# Patient Record
Sex: MALE | Race: BLACK OR AFRICAN AMERICAN | NOT HISPANIC OR LATINO | URBAN - METROPOLITAN AREA
[De-identification: names, ages, dates, MRNs, and addresses within clinical notes are randomized per-mention and may not be internally consistent; named-entity substitution may affect disease eponyms.]

---

## 2018-09-12 ENCOUNTER — TRANSFERRED RECORDS (OUTPATIENT)
Dept: HEALTH INFORMATION MANAGEMENT | Facility: CLINIC | Age: 53
End: 2018-09-12

## 2018-10-03 ENCOUNTER — PATIENT OUTREACH (OUTPATIENT)
Dept: CARE COORDINATION | Facility: CLINIC | Age: 53
End: 2018-10-03

## 2023-05-26 ENCOUNTER — HOSPITAL ENCOUNTER (EMERGENCY)
Facility: CLINIC | Age: 58
Discharge: HOME | End: 2023-05-26
Attending: EMERGENCY MEDICINE | Admitting: EMERGENCY MEDICINE
Payer: COMMERCIAL

## 2023-05-26 VITALS
SYSTOLIC BLOOD PRESSURE: 131 MMHG | DIASTOLIC BLOOD PRESSURE: 80 MMHG | WEIGHT: 155 LBS | TEMPERATURE: 97.4 F | HEART RATE: 79 BPM | RESPIRATION RATE: 14 BRPM | OXYGEN SATURATION: 97 %

## 2023-05-26 DIAGNOSIS — M54.6 CHRONIC MIDLINE THORACIC BACK PAIN: ICD-10-CM

## 2023-05-26 DIAGNOSIS — M54.16 LUMBAR RADICULOPATHY: ICD-10-CM

## 2023-05-26 DIAGNOSIS — G89.29 CHRONIC MIDLINE THORACIC BACK PAIN: ICD-10-CM

## 2023-05-26 PROCEDURE — 99283 EMERGENCY DEPT VISIT LOW MDM: CPT

## 2023-05-26 RX ORDER — CYCLOBENZAPRINE HCL 5 MG
5-10 TABLET ORAL 3 TIMES DAILY PRN
Qty: 20 TABLET | Refills: 0 | Status: SHIPPED | OUTPATIENT
Start: 2023-05-26

## 2023-05-26 NOTE — ED TRIAGE NOTES
Pt reports lower back pain that radiates down both legs. Also reports right shoulder pain and pain between shoulder blades. Pain to all areas have been present for 3 months to 2 years. Patient has not taken any medication for pain today. Patient reports falling outside on his backside in March.     Triage Assessment     Row Name 05/26/23 1050       Triage Assessment (Adult)    Airway WDL WDL       Respiratory WDL    Respiratory WDL WDL       Skin Circulation/Temperature WDL    Skin Circulation/Temperature WDL WDL       Cardiac WDL    Cardiac WDL WDL       Peripheral/Neurovascular WDL    Peripheral Neurovascular WDL WDL       Cognitive/Neuro/Behavioral WDL    Cognitive/Neuro/Behavioral WDL WDL

## 2023-05-26 NOTE — ED PROVIDER NOTES
History     Chief Complaint:  Back Pain and Shoulder Pain       HPI   Amadou Brizuela is a 58 year old male who presents concerned about back pain.  The patient states he suffered pain in his back affecting the lower third at the midline.  This has been ongoing for over 20 years.  He states the pain will radiate down the back and specifically will also travel down the left lower extremity.  The pain is constant.  Periodically he tries Tylenol with partial pain relief.  He denies a previous trauma of any sort that may have caused injury to his spine.  He was seen once in the past and states he was administered an injection.  He has not seen a provider for back pain for a number of years at this point.  Denies chest pain or dyspnea.  No abdominal pain.  Denies bowel or bladder dysfunction although he does feel constipated.  No loss of control of his bowel or bladder.  He feels numbness at times affecting his left arm and leg.  Numbness also chronic symptom.  Denies any distinct weakness or paralysis of the extremities.  Denies fever chills or sweats.  Denies recent fall although he did fall on icy ground last winter which he thinks may have worsened his ongoing back pain issue.  Not anticoagulated.  Does not have a primary care provider.  No other complaints.      Independent Historian:   None - Patient Only    Review of External Notes:   None    Medications:    cyclobenzaprine (FLEXERIL) 5 MG tablet      Past Medical History:    No past medical history on file.    Past Surgical History:    No past surgical history on file.     Physical Exam   Patient Vitals for the past 24 hrs:   BP Temp Temp src Pulse Resp SpO2 Weight   05/26/23 1055 -- -- -- -- -- -- 70.3 kg (155 lb)   05/26/23 1041 (!) 148/78 97.4  F (36.3  C) Temporal 83 14 100 % --      Physical Exam  SKIN:  Warm, dry.  No erythema or ecchymoses over the back.  HEMATOLOGIC/IMMUNOLOGIC/LYMPHATIC:  No pallor.  HENT: Painless active range of motion of head and  neck.  EYES:  Conjunctivae normal.  CARDIOVASCULAR:  Regular rate and rhythm.  No murmur.  RESPIRATORY:  No respiratory distress, breath sounds equal and normal.  GASTROINTESTINAL:  Soft, nontender abdomen with active bowel sounds.  No distention.  No palpable mass.  MUSCULOSKELETAL: Normal body habitus.  Well-tolerated active range of motion of the torso and extremities.  Cervical through lumbar spinous processes palpably nontender without palpable deformity of the back.  NEUROLOGIC:  Alert, conversant.  No gross motor or tactile sensory deficit.  Equal normal bilateral patellar deep tendon reflexes.  PSYCHIATRIC:  Normal mood.    Emergency Department Course     Emergency Department Course & Assessments:     Interventions:  Medications - No data to display     Assessments:  Evaluated at the bedside with history and physical    Consultations/Discussion of Management or Tests:  None        Social Determinants of Health affecting care:   None    Disposition:  The patient was discharged to home.     Impression & Plan      Medical Decision Making:  This patient presents concerned about approximately 20 years of pain in the mid to low back with radiation to the left lower extremity.  Very likely suffering lumbar radiculopathy.  No red flag findings on history or exam to perform emergency testing such as imaging.  I do not think he required blood work either.  This has been a chronic issue.  No bowel or bladder dysfunction to suggest spinal cord pathology.  I have referred the patient to [facility] orth[facility]ics where he can be evaluated by a spine specialist.  Encouraged Tylenol and or ibuprofen and I prescribed Flexeril as a pain control option.    Diagnosis:    ICD-10-CM    1. Chronic midline thoracic back pain  M54.6     G89.29       2. Lumbar radiculopathy  M54.16            Discharge Medications:  New Prescriptions    CYCLOBENZAPRINE (FLEXERIL) 5 MG TABLET    Take 1-2 tablets (5-10 mg) by mouth 3 times daily as  needed for muscle spasms        5/26/2023   Sandro Kapoor MD Moe, James Thomas, MD  05/26/23 3366